# Patient Record
Sex: FEMALE | Race: WHITE | NOT HISPANIC OR LATINO | Employment: OTHER | URBAN - METROPOLITAN AREA
[De-identification: names, ages, dates, MRNs, and addresses within clinical notes are randomized per-mention and may not be internally consistent; named-entity substitution may affect disease eponyms.]

---

## 2023-08-29 PROBLEM — F33.3 SEVERE RECURRENT MAJOR DEPRESSION WITH PSYCHOTIC FEATURES (MULTI): Status: ACTIVE | Noted: 2023-08-29

## 2023-08-29 PROBLEM — F33.1 MAJOR DEPRESSIVE DISORDER, RECURRENT EPISODE, MODERATE DEGREE (MULTI): Status: ACTIVE | Noted: 2023-08-29

## 2023-08-29 PROBLEM — F70 MILD INTELLECTUAL DISABILITY: Status: ACTIVE | Noted: 2023-08-29

## 2023-08-29 PROBLEM — T43.505A NEUROLEPTIC-INDUCED PARKINSONISM (MULTI): Status: ACTIVE | Noted: 2023-08-29

## 2023-08-29 PROBLEM — F63.9 IMPULSE CONTROL DISORDER: Status: ACTIVE | Noted: 2023-08-29

## 2023-08-29 PROBLEM — G21.11 NEUROLEPTIC-INDUCED PARKINSONISM (MULTI): Status: ACTIVE | Noted: 2023-08-29

## 2023-08-29 PROBLEM — R41.9 NEUROCOGNITIVE DISORDER: Status: ACTIVE | Noted: 2023-08-29

## 2023-08-29 RX ORDER — ARIPIPRAZOLE 10 MG/1
1 TABLET ORAL NIGHTLY
COMMUNITY
Start: 2020-01-25 | End: 2023-10-05 | Stop reason: SINTOL

## 2023-08-29 RX ORDER — ICOSAPENT ETHYL 1 G/1
CAPSULE ORAL
COMMUNITY
Start: 2020-01-25

## 2023-08-29 RX ORDER — MONTELUKAST SODIUM 4 MG/1
TABLET, CHEWABLE ORAL
COMMUNITY
Start: 2020-01-25

## 2023-08-29 RX ORDER — METFORMIN HYDROCHLORIDE 1000 MG/1
TABLET ORAL
COMMUNITY
Start: 2020-01-25

## 2023-08-29 RX ORDER — FUROSEMIDE 20 MG/1
TABLET ORAL
COMMUNITY
Start: 2020-01-25

## 2023-10-05 ENCOUNTER — OFFICE VISIT (OUTPATIENT)
Dept: NEUROLOGY | Facility: CLINIC | Age: 63
End: 2023-10-05
Payer: COMMERCIAL

## 2023-10-05 VITALS
HEART RATE: 72 BPM | SYSTOLIC BLOOD PRESSURE: 132 MMHG | WEIGHT: 197 LBS | HEIGHT: 60 IN | BODY MASS INDEX: 38.68 KG/M2 | DIASTOLIC BLOOD PRESSURE: 84 MMHG

## 2023-10-05 DIAGNOSIS — T43.505A NEUROLEPTIC-INDUCED PARKINSONISM (MULTI): ICD-10-CM

## 2023-10-05 DIAGNOSIS — F70 MILD INTELLECTUAL DISABILITY: Primary | ICD-10-CM

## 2023-10-05 DIAGNOSIS — G21.11 NEUROLEPTIC-INDUCED PARKINSONISM (MULTI): ICD-10-CM

## 2023-10-05 PROCEDURE — 1036F TOBACCO NON-USER: CPT | Performed by: PSYCHIATRY & NEUROLOGY

## 2023-10-05 PROCEDURE — 1159F MED LIST DOCD IN RCRD: CPT | Performed by: PSYCHIATRY & NEUROLOGY

## 2023-10-05 PROCEDURE — 99205 OFFICE O/P NEW HI 60 MIN: CPT | Performed by: PSYCHIATRY & NEUROLOGY

## 2023-10-05 RX ORDER — ATORVASTATIN CALCIUM 40 MG/1
40 TABLET, FILM COATED ORAL DAILY
COMMUNITY

## 2023-10-05 RX ORDER — ESCITALOPRAM OXALATE 5 MG/1
5 TABLET ORAL DAILY
COMMUNITY

## 2023-10-05 RX ORDER — ALLOPURINOL 100 MG/1
100 TABLET ORAL DAILY
COMMUNITY

## 2023-10-05 RX ORDER — TIRZEPATIDE 2.5 MG/.5ML
INJECTION, SOLUTION SUBCUTANEOUS
COMMUNITY
Start: 2023-09-08 | End: 2024-03-10 | Stop reason: SDUPTHER

## 2023-10-05 RX ORDER — LISINOPRIL 2.5 MG/1
10 TABLET ORAL DAILY
COMMUNITY

## 2023-10-05 RX ORDER — POTASSIUM CHLORIDE 750 MG/1
10 TABLET, FILM COATED, EXTENDED RELEASE ORAL
COMMUNITY
Start: 2021-07-15

## 2023-10-05 RX ORDER — PIOGLITAZONEHYDROCHLORIDE 15 MG/1
15 TABLET ORAL DAILY
COMMUNITY

## 2023-10-05 RX ORDER — POLYETHYLENE GLYCOL 3350 17 G/17G
17 POWDER, FOR SOLUTION ORAL
COMMUNITY
Start: 2023-03-03

## 2023-10-05 RX ORDER — ACETAMINOPHEN 500 MG
1 TABLET ORAL
COMMUNITY
Start: 2021-09-22

## 2023-10-05 NOTE — PATIENT INSTRUCTIONS
Your client, Trini Perez is a delightful person, and appears to be doing well.  She has very mild   residual features of parkinsonism related to a history of being on a neuroleptic(Abilify).  I think these symptoms will gradually improve off the Abilify- and avoid any further exposure to a neuroleptic type medication.  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  Thank you for coming in today, follow up is left open ended.

## 2023-10-05 NOTE — PROGRESS NOTES
Subjective   Trini Perez is a 63 y.o.   female.  This is a 63 year old woman, with chronic intellectual disability, lives in her own apartment with some visiting assistance.  She was referred for a history of involuntary movements in the right hand- has resolved, with tapering off of Abilify (for hallucinations- resolved).  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   Her caregiver today, Lashon, states the patient is at baseline cognitively- she is high functioning.  She has recurrent depression with psychosis.  Her psychiatry professional is Lidya Arciniega.        Objective   Neurological Exam  Mental Status   Oriented only to person. Oriented to season, not month, year.. Mild dysarthria present. Language is fluent with no aphasia.    Cranial Nerves  CN II: Visual fields full to confrontation.  CN III, IV, VI: Extraocular movements intact bilaterally.   Right pupil: 3 mm.   Left pupil: 3 mm.  CN VII: Full and symmetric facial movement.  CN VIII: Hearing is normal.  CN IX, X: Palate elevates symmetrically  CN XI: Shoulder shrug strength is normal.  CN XII: Tongue midline without atrophy or fasciculations.    Motor  Normal muscle bulk throughout. No fasciculations present. Increased muscle tone. No abnormal involuntary movements. Diffuse bradykinesia..   Strength is 5/5 throughout all four extremities.    Sensory  Sensation is intact to light touch, pinprick, vibration and proprioception in all four extremities.    Reflexes                                            Right                      Left  Brachioradialis                    1+                         1+  Biceps                                 1+                         1+  Triceps                                1+                         1+  Finger flex                                                   1+  Hamstring                                                    1+  Patellar                                1+                         1+  Achilles                                 1+                         1+  Right Plantar: downgoing  Left Plantar: downgoing    Coordination  Right: Finger-to-nose normal.Left: Finger-to-nose normal.    Gait  Casual gait: Reduced right arm swing. Reduced left arm swing. Romberg is absent. Normal pull test. Able to rise from chair without using arms.    Physical Exam  Eyes:      Extraocular Movements: Extraocular movements intact.   Neurological:      Cranial Nerves: Dysarthria present.      Motor: Motor strength is normal.     Coordination: Romberg sign negative.      Deep Tendon Reflexes:      Reflex Scores:       Tricep reflexes are 1+ on the right side and 1+ on the left side.       Bicep reflexes are 1+ on the right side and 1+ on the left side.       Brachioradialis reflexes are 1+ on the right side and 1+ on the left side.       Patellar reflexes are 1+ on the right side and 1+ on the left side.       Achilles reflexes are 1+ on the right side and 1+ on the left side.      I personally reviewed laboratory, radiographic, and medical studies which were pertinent for Trini.    Assessment/Plan

## 2023-12-20 ENCOUNTER — APPOINTMENT (OUTPATIENT)
Dept: RADIOLOGY | Facility: HOSPITAL | Age: 63
End: 2023-12-20
Payer: COMMERCIAL

## 2023-12-20 ENCOUNTER — HOSPITAL ENCOUNTER (EMERGENCY)
Facility: HOSPITAL | Age: 63
Discharge: HOME | End: 2023-12-20
Attending: EMERGENCY MEDICINE
Payer: COMMERCIAL

## 2023-12-20 VITALS
SYSTOLIC BLOOD PRESSURE: 150 MMHG | RESPIRATION RATE: 20 BRPM | OXYGEN SATURATION: 98 % | HEART RATE: 82 BPM | WEIGHT: 197.09 LBS | TEMPERATURE: 98.1 F | HEIGHT: 60 IN | DIASTOLIC BLOOD PRESSURE: 77 MMHG | BODY MASS INDEX: 38.69 KG/M2

## 2023-12-20 DIAGNOSIS — R06.02 SHORTNESS OF BREATH: Primary | ICD-10-CM

## 2023-12-20 LAB
ALBUMIN SERPL-MCNC: 3.9 G/DL (ref 3.5–5)
ALBUMIN SERPL-MCNC: 4 G/DL (ref 3.5–5)
ALP BLD-CCNC: 80 U/L (ref 35–125)
ALP BLD-CCNC: 81 U/L (ref 35–125)
ALT SERPL-CCNC: 30 U/L (ref 5–40)
ALT SERPL-CCNC: 30 U/L (ref 5–40)
ANION GAP SERPL CALC-SCNC: 13 MMOL/L
ANION GAP SERPL CALC-SCNC: 18 MMOL/L
AST SERPL-CCNC: 31 U/L (ref 5–40)
AST SERPL-CCNC: 36 U/L (ref 5–40)
BASOPHILS # BLD AUTO: 0.05 X10*3/UL (ref 0–0.1)
BASOPHILS NFR BLD AUTO: 0.4 %
BILIRUB SERPL-MCNC: 0.7 MG/DL (ref 0.1–1.2)
BILIRUB SERPL-MCNC: 0.7 MG/DL (ref 0.1–1.2)
BUN SERPL-MCNC: 8 MG/DL (ref 8–25)
BUN SERPL-MCNC: 8 MG/DL (ref 8–25)
CALCIUM SERPL-MCNC: 9.1 MG/DL (ref 8.5–10.4)
CALCIUM SERPL-MCNC: 9.3 MG/DL (ref 8.5–10.4)
CHLORIDE SERPL-SCNC: 103 MMOL/L (ref 97–107)
CHLORIDE SERPL-SCNC: 104 MMOL/L (ref 97–107)
CO2 SERPL-SCNC: 16 MMOL/L (ref 24–31)
CO2 SERPL-SCNC: 20 MMOL/L (ref 24–31)
CREAT SERPL-MCNC: 0.4 MG/DL (ref 0.4–1.6)
CREAT SERPL-MCNC: 0.4 MG/DL (ref 0.4–1.6)
D DIMER PPP FEU-MCNC: 0.2 MG/L FEU (ref 0.19–0.5)
EOSINOPHIL # BLD AUTO: 0.06 X10*3/UL (ref 0–0.7)
EOSINOPHIL NFR BLD AUTO: 0.5 %
ERYTHROCYTE [DISTWIDTH] IN BLOOD BY AUTOMATED COUNT: 13.5 % (ref 11.5–14.5)
FLUAV RNA RESP QL NAA+PROBE: NOT DETECTED
FLUBV RNA RESP QL NAA+PROBE: NOT DETECTED
GFR SERPL CREATININE-BSD FRML MDRD: >90 ML/MIN/1.73M*2
GFR SERPL CREATININE-BSD FRML MDRD: >90 ML/MIN/1.73M*2
GLUCOSE SERPL-MCNC: 124 MG/DL (ref 65–99)
GLUCOSE SERPL-MCNC: 134 MG/DL (ref 65–99)
HCT VFR BLD AUTO: 39.9 % (ref 36–46)
HGB BLD-MCNC: 13.8 G/DL (ref 12–16)
HOLD SPECIMEN: NORMAL
IMM GRANULOCYTES # BLD AUTO: 0.05 X10*3/UL (ref 0–0.7)
IMM GRANULOCYTES NFR BLD AUTO: 0.4 % (ref 0–0.9)
LYMPHOCYTES # BLD AUTO: 2.35 X10*3/UL (ref 1.2–4.8)
LYMPHOCYTES NFR BLD AUTO: 20.3 %
MCH RBC QN AUTO: 29.7 PG (ref 26–34)
MCHC RBC AUTO-ENTMCNC: 34.6 G/DL (ref 32–36)
MCV RBC AUTO: 86 FL (ref 80–100)
MONOCYTES # BLD AUTO: 0.89 X10*3/UL (ref 0.1–1)
MONOCYTES NFR BLD AUTO: 7.7 %
NEUTROPHILS # BLD AUTO: 8.17 X10*3/UL (ref 1.2–7.7)
NEUTROPHILS NFR BLD AUTO: 70.7 %
NRBC BLD-RTO: 0 /100 WBCS (ref 0–0)
NT-PROBNP SERPL-MCNC: <36 PG/ML (ref 0–353)
PLATELET # BLD AUTO: 311 X10*3/UL (ref 150–450)
POTASSIUM SERPL-SCNC: 3.6 MMOL/L (ref 3.4–5.1)
POTASSIUM SERPL-SCNC: 3.8 MMOL/L (ref 3.4–5.1)
PROT SERPL-MCNC: 7.4 G/DL (ref 5.9–7.9)
PROT SERPL-MCNC: 7.7 G/DL (ref 5.9–7.9)
RBC # BLD AUTO: 4.64 X10*6/UL (ref 4–5.2)
SARS-COV-2 RNA RESP QL NAA+PROBE: NOT DETECTED
SODIUM SERPL-SCNC: 137 MMOL/L (ref 133–145)
SODIUM SERPL-SCNC: 137 MMOL/L (ref 133–145)
TROPONIN T SERPL-MCNC: 7 NG/L
TROPONIN T SERPL-MCNC: 8 NG/L
WBC # BLD AUTO: 11.6 X10*3/UL (ref 4.4–11.3)

## 2023-12-20 PROCEDURE — 93005 ELECTROCARDIOGRAM TRACING: CPT

## 2023-12-20 PROCEDURE — 71046 X-RAY EXAM CHEST 2 VIEWS: CPT

## 2023-12-20 PROCEDURE — 80053 COMPREHEN METABOLIC PANEL: CPT | Performed by: EMERGENCY MEDICINE

## 2023-12-20 PROCEDURE — 99283 EMERGENCY DEPT VISIT LOW MDM: CPT | Mod: 25

## 2023-12-20 PROCEDURE — 85025 COMPLETE CBC W/AUTO DIFF WBC: CPT | Performed by: EMERGENCY MEDICINE

## 2023-12-20 PROCEDURE — 85300 ANTITHROMBIN III ACTIVITY: CPT | Performed by: EMERGENCY MEDICINE

## 2023-12-20 PROCEDURE — 87636 SARSCOV2 & INF A&B AMP PRB: CPT | Performed by: EMERGENCY MEDICINE

## 2023-12-20 PROCEDURE — 99284 EMERGENCY DEPT VISIT MOD MDM: CPT | Performed by: EMERGENCY MEDICINE

## 2023-12-20 PROCEDURE — 36415 COLL VENOUS BLD VENIPUNCTURE: CPT | Performed by: EMERGENCY MEDICINE

## 2023-12-20 PROCEDURE — 84484 ASSAY OF TROPONIN QUANT: CPT | Performed by: EMERGENCY MEDICINE

## 2023-12-20 PROCEDURE — 83880 ASSAY OF NATRIURETIC PEPTIDE: CPT | Performed by: EMERGENCY MEDICINE

## 2023-12-20 RX ORDER — IPRATROPIUM BROMIDE AND ALBUTEROL SULFATE 2.5; .5 MG/3ML; MG/3ML
3 SOLUTION RESPIRATORY (INHALATION) ONCE
Status: DISCONTINUED | OUTPATIENT
Start: 2023-12-20 | End: 2023-12-20

## 2023-12-20 ASSESSMENT — COLUMBIA-SUICIDE SEVERITY RATING SCALE - C-SSRS
2. HAVE YOU ACTUALLY HAD ANY THOUGHTS OF KILLING YOURSELF?: NO
1. IN THE PAST MONTH, HAVE YOU WISHED YOU WERE DEAD OR WISHED YOU COULD GO TO SLEEP AND NOT WAKE UP?: NO
6. HAVE YOU EVER DONE ANYTHING, STARTED TO DO ANYTHING, OR PREPARED TO DO ANYTHING TO END YOUR LIFE?: NO

## 2023-12-20 ASSESSMENT — PAIN - FUNCTIONAL ASSESSMENT: PAIN_FUNCTIONAL_ASSESSMENT: 0-10

## 2023-12-20 NOTE — DISCHARGE INSTRUCTIONS
Please return to the ER for symptoms worsen otherwise please make sure to follow-up with her primary care doctor.

## 2023-12-20 NOTE — ED PROVIDER NOTES
HPI   Chief Complaint   Patient presents with    Shortness of Breath     Patient having some shortness of breathe       HPI     63-year-old female with history of developmental disability, diabetes, recent right ankle fracture presenting with abnormal behavior.  Per caretaker at bedside patient has had abnormal breathing since this morning.  Patient has episodes where she takes rapid deep breaths which is not her baseline.  Endorses having shortness of breath, denies any chest pain no nausea no vomiting abdominal pain or.  Pain anywhere else.  Otherwise difficult to get a history             No data recorded                Patient History   History reviewed. No pertinent past medical history.  History reviewed. No pertinent surgical history.  No family history on file.  Social History     Tobacco Use    Smoking status: Never     Passive exposure: Never    Smokeless tobacco: Never   Substance Use Topics    Alcohol use: Never    Drug use: Never       Physical Exam   ED Triage Vitals [12/20/23 1046]   Temp Heart Rate Resp BP   36.7 °C (98.1 °F) 81 18 166/68      SpO2 Temp Source Heart Rate Source Patient Position   95 % Oral -- Sitting      BP Location FiO2 (%)     Left arm --       Physical Exam  Vitals and nursing note reviewed.   Constitutional:       General: She is not in acute distress.     Appearance: She is well-developed.   HENT:      Head: Normocephalic and atraumatic.   Eyes:      Conjunctiva/sclera: Conjunctivae normal.   Cardiovascular:      Rate and Rhythm: Normal rate and regular rhythm.   Pulmonary:      Effort: Pulmonary effort is normal. No respiratory distress.      Breath sounds: Normal breath sounds. No decreased breath sounds, wheezing or rales.   Abdominal:      Palpations: Abdomen is soft.      Tenderness: There is no abdominal tenderness.   Musculoskeletal:         General: No swelling.      Cervical back: Neck supple.      Comments: Right lower extremity in cast.  Toes warm well-perfused good  cap refill   Skin:     General: Skin is warm and dry.      Capillary Refill: Capillary refill takes less than 2 seconds.   Neurological:      Mental Status: She is alert.   Psychiatric:         Mood and Affect: Mood normal.         ED Course & MDM   Diagnoses as of 12/20/23 1517   Shortness of breath       Medical Decision Making    Vital stable, physical exam as above.  Unclear etiology of patient's symptoms, she does not appear to be short of breath on my exam however every once in a while she does have these short rapid breaths which self resolve after about 5 seconds.  Given patient is in a boot with a recent action ankle fracture we will get a D-dimer.  Chest x-ray COVID and flu pending.  Cardiac workup also pending.  Patient to be reevaluated pending pending results.      Patient appears at baseline satting well on room air lab work and imaging largely unremarkable awaiting repeat troponin to be discharged by oncoming physician if within normal limits  Procedure  Procedures     Juanita Azul MD  12/20/23 7945

## 2023-12-20 NOTE — PROGRESS NOTES
Trini Perez is a 63 y.o. female on day 0 of admission presenting with No Principal Problem: There is no principal problem currently on the Problem List. Please update the Problem List and refresh..  Care of the patient signed out to me pending repeat troponin level.  In short she is a 63-year-old female from a group home with a history of mental display presenting to the ED for evaluation of shortness of breath.  She has been having short episodes of shallow breathing during which she feels short of breath.  Oxygen saturation does not decline during these episodes as administered in the ED here.  She has a negative D-dimer excluding PE, she is negative for COVID flu and there is no evidence of heart failure or pneumonia.  At the time of signout she was pending repeat troponin to exclude acute coronary syndrome.  X-ray does show mild cardiomegaly.    I did review her EKG showing T wave inversions in V2 and V3, otherwise no ST elevation or depression, no acute ischemic pattern.  EKG is otherwise unremarkable.  She has not had any chest pain during these episodes and does not report any chest pain now. Secondary problems negative, no evidence of evolving ischemia/ongoing ACS causing her symptoms.  She was discharged in stable condition.    Assessment/Plan   Active Problems:  There are no active Hospital Problems.  Shortness of breath       I spent 20 minutes in the professional and overall care of this patient.      Jack Sanz DO

## 2023-12-21 LAB
ATRIAL RATE: 81 BPM
P AXIS: 51 DEGREES
P OFFSET: 193 MS
P ONSET: 133 MS
PR INTERVAL: 160 MS
Q ONSET: 213 MS
QRS COUNT: 14 BEATS
QRS DURATION: 88 MS
QT INTERVAL: 410 MS
QTC CALCULATION(BAZETT): 476 MS
QTC FREDERICIA: 453 MS
R AXIS: 17 DEGREES
T AXIS: 28 DEGREES
T OFFSET: 418 MS
VENTRICULAR RATE: 81 BPM

## 2024-01-08 ENCOUNTER — OFFICE VISIT (OUTPATIENT)
Dept: ENDOCRINOLOGY | Facility: CLINIC | Age: 64
End: 2024-01-08
Payer: COMMERCIAL

## 2024-01-08 VITALS
BODY MASS INDEX: 35.74 KG/M2 | HEART RATE: 69 BPM | WEIGHT: 183 LBS | DIASTOLIC BLOOD PRESSURE: 87 MMHG | SYSTOLIC BLOOD PRESSURE: 152 MMHG

## 2024-01-08 DIAGNOSIS — E11.9 TYPE 2 DIABETES MELLITUS WITHOUT COMPLICATION, WITHOUT LONG-TERM CURRENT USE OF INSULIN (MULTI): Primary | ICD-10-CM

## 2024-01-08 LAB — POC HEMOGLOBIN A1C: 6.5 % (ref 4.2–6.5)

## 2024-01-08 PROCEDURE — 4010F ACE/ARB THERAPY RXD/TAKEN: CPT | Performed by: INTERNAL MEDICINE

## 2024-01-08 PROCEDURE — 99204 OFFICE O/P NEW MOD 45 MIN: CPT | Performed by: INTERNAL MEDICINE

## 2024-01-08 PROCEDURE — 3077F SYST BP >= 140 MM HG: CPT | Performed by: INTERNAL MEDICINE

## 2024-01-08 PROCEDURE — 1159F MED LIST DOCD IN RCRD: CPT | Performed by: INTERNAL MEDICINE

## 2024-01-08 PROCEDURE — 1036F TOBACCO NON-USER: CPT | Performed by: INTERNAL MEDICINE

## 2024-01-08 PROCEDURE — 1160F RVW MEDS BY RX/DR IN RCRD: CPT | Performed by: INTERNAL MEDICINE

## 2024-01-08 PROCEDURE — 3079F DIAST BP 80-89 MM HG: CPT | Performed by: INTERNAL MEDICINE

## 2024-01-08 PROCEDURE — 83036 HEMOGLOBIN GLYCOSYLATED A1C: CPT | Performed by: INTERNAL MEDICINE

## 2024-01-08 RX ORDER — LANCETS 33 GAUGE
EACH MISCELLANEOUS
Qty: 100 EACH | Refills: 3 | Status: SHIPPED | OUTPATIENT
Start: 2024-01-08

## 2024-01-08 RX ORDER — DEXTROSE 4 G
TABLET,CHEWABLE ORAL
Qty: 1 EACH | Refills: 0 | Status: SHIPPED | OUTPATIENT
Start: 2024-01-08

## 2024-01-08 RX ORDER — BLOOD-GLUCOSE METER
EACH MISCELLANEOUS
Qty: 100 STRIP | Refills: 3 | Status: SHIPPED | OUTPATIENT
Start: 2024-01-08

## 2024-01-08 ASSESSMENT — ENCOUNTER SYMPTOMS
NAUSEA: 0
DIFFICULTY URINATING: 0
COUGH: 0
CONSTIPATION: 0
ENDOCRINE NEGATIVE: 1
FREQUENCY: 0
UNEXPECTED WEIGHT CHANGE: 0
SHORTNESS OF BREATH: 0
VOMITING: 0
DIARRHEA: 0

## 2024-01-08 NOTE — PATIENT INSTRUCTIONS
RECOMMENDATIONS  Continue current program    Follow up 6 months    Glucose meter for use as needed    Labs as ordered by Dr. Peters.

## 2024-01-08 NOTE — PROGRESS NOTES
History Of Present Illness  Trini Perez is a 63 y.o. female     Duration of type 2 diabetes mellitus:  at least 17 years  Complications:  none    Right distal fibular fracture 2 months ago on ice, casted, now in a plastic boot.    Weight loss 24 lbs on Mounjaro.     Mounjaro 2.5 mg/week on Saturdays, started 6 months ago.    Metformin 1000 mg twice daily  Pioglitazone 15 mg/day    Patient is not testing glucose     Patient lives independently  Presented with .     Last eye exam:  2023    Past Medical History  She has no past medical history on file.    Surgical History  She has no past surgical history on file.     Social History  She reports that she has never smoked. She has never been exposed to tobacco smoke. She has never used smokeless tobacco. She reports that she does not drink alcohol and does not use drugs.    Family History  No family history on file.    Medications  Current Outpatient Medications   Medication Instructions    allopurinol (ZYLOPRIM) 100 mg, oral, Daily    atorvastatin (LIPITOR) 40 mg, oral, Daily    cholecalciferol (Vitamin D-3) 50 mcg (2,000 unit) capsule 1 capsule, oral, Daily RT    colestipol (Colestid) 1 gram tablet oral    escitalopram (LEXAPRO) 5 mg, oral, Daily    furosemide (Lasix) 20 mg tablet oral    icosapent ethyL (Vascepa) 1 gram capsule oral    lisinopril 10 mg, oral, Daily    metFORMIN (Glucophage) 1,000 mg tablet oral    Mounjaro 2.5 mg/0.5 mL pen injector INJECT 2.5 MG SUBCUTANEOUSLY 1 TIME A WEEK    multivitamin capsule oral, Daily RT    pioglitazone (ACTOS) 15 mg, oral, Daily    polyethylene glycol (GLYCOLAX, MIRALAX) 17 g, oral, Daily RT    potassium chloride CR 10 mEq ER tablet 10 mEq, oral, Daily RT       Allergies  Patient has no known allergies.    Review of Systems   Constitutional:  Negative for unexpected weight change (intentional loss).   Eyes:  Negative for visual disturbance.   Respiratory:  Negative for cough and shortness of breath.     Cardiovascular:  Negative for chest pain.   Gastrointestinal:  Negative for constipation, diarrhea, nausea and vomiting.   Endocrine: Negative.    Genitourinary:  Negative for difficulty urinating and frequency.         Last Recorded Vitals  Blood pressure 152/87, pulse 69, weight 83 kg (183 lb).    Physical Exam  Constitutional:       General: She is not in acute distress.  HENT:      Head: Normocephalic.   Eyes:      Extraocular Movements: Extraocular movements intact.   Neck:      Thyroid: No thyromegaly.   Cardiovascular:      Pulses:           Radial pulses are 2+ on the right side.        Dorsalis pedis pulses are 2+ on the right side and 2+ on the left side.   Musculoskeletal:      Right lower leg: No edema.      Left lower leg: No edema.      Right foot: No deformity.      Left foot: No deformity.   Lymphadenopathy:      Cervical: No cervical adenopathy.   Neurological:      Mental Status: She is alert.      Motor: No tremor.          Relevant Results  Glucose   Date Value   12/20/2023 134 mg/dL (H)   12/20/2023 124 mg/dL (H)   07/20/2020 88 mg/dL   11/13/2018 93 MG/DL     Hemoglobin A1C (%)   Date Value   09/21/2023 6.7 (H)   03/14/2023 6.1 (H)   08/17/2022 6.1 (H)     Bicarbonate   Date Value   12/20/2023 20 mmol/L (L)   12/20/2023 16 mmol/L (L)   07/20/2020 25 mmol/L   11/13/2018 23 MMOL/L (L)     Urea Nitrogen   Date Value   12/20/2023 8 mg/dL   12/20/2023 8 mg/dL   07/20/2020 12 mg/dL   11/13/2018 11 MG/DL     Creatinine   Date Value   12/20/2023 0.40 mg/dL   12/20/2023 0.40 mg/dL   07/20/2020 0.43 mg/dL (L)   11/13/2018 0.5 MG/DL     Lab Results   Component Value Date    TSH 7.92 (H) 07/20/2020       IMPRESSION  TYPE 2 DIABETES MELLITUS  Well controlled glucose   No known complications      RECOMMENDATIONS  Continue current program    Follow up 6 months    Glucose meter for use as needed    Labs as ordered by Dr. Peters.

## 2024-01-08 NOTE — LETTER
January 9, 2024     Malik Peters DO  [address] [place] [id number]    Patient: Trini Perez   YOB: 1960   Date of Visit: 1/8/2024       Dear Dr. Malik Peters DO:    Thank you for referring Trini Perez to me for evaluation. Below are my notes for this consultation.  If you have questions, please do not hesitate to call me. I look forward to following your patient along with you.       Sincerely,     Star Bhandari MD      CC: No Recipients  ______________________________________________________________________________________    History Of Present Illness  Trini Perez is a 63 y.o. female     Duration of type 2 diabetes mellitus:  at least 17 years  Complications:  none    Right distal fibular fracture 2 months ago on ice, casted, now in a plastic boot.    Weight loss 24 lbs on Mounjaro.     Mounjaro 2.5 mg/week on Saturdays, started 6 months ago.    Metformin 1000 mg twice daily  Pioglitazone 15 mg/day    Patient is not testing glucose     Patient lives independently  Presented with .     Last eye exam:  2023    Past Medical History  She has no past medical history on file.    Surgical History  She has no past surgical history on file.     Social History  She reports that she has never smoked. She has never been exposed to tobacco smoke. She has never used smokeless tobacco. She reports that she does not drink alcohol and does not use drugs.    Family History  No family history on file.    Medications  Current Outpatient Medications   Medication Instructions   • allopurinol (ZYLOPRIM) 100 mg, oral, Daily   • atorvastatin (LIPITOR) 40 mg, oral, Daily   • cholecalciferol (Vitamin D-3) 50 mcg (2,000 unit) capsule 1 capsule, oral, Daily RT   • colestipol (Colestid) 1 gram tablet oral   • escitalopram (LEXAPRO) 5 mg, oral, Daily   • furosemide (Lasix) 20 mg tablet oral   • icosapent ethyL (Vascepa) 1 gram capsule oral   • lisinopril 10 mg, oral, Daily   •  metFORMIN (Glucophage) 1,000 mg tablet oral   • Mounjaro 2.5 mg/0.5 mL pen injector INJECT 2.5 MG SUBCUTANEOUSLY 1 TIME A WEEK   • multivitamin capsule oral, Daily RT   • pioglitazone (ACTOS) 15 mg, oral, Daily   • polyethylene glycol (GLYCOLAX, MIRALAX) 17 g, oral, Daily RT   • potassium chloride CR 10 mEq ER tablet 10 mEq, oral, Daily RT       Allergies  Patient has no known allergies.    Review of Systems   Constitutional:  Negative for unexpected weight change (intentional loss).   Eyes:  Negative for visual disturbance.   Respiratory:  Negative for cough and shortness of breath.    Cardiovascular:  Negative for chest pain.   Gastrointestinal:  Negative for constipation, diarrhea, nausea and vomiting.   Endocrine: Negative.    Genitourinary:  Negative for difficulty urinating and frequency.         Last Recorded Vitals  Blood pressure 152/87, pulse 69, weight 83 kg (183 lb).    Physical Exam  Constitutional:       General: She is not in acute distress.  HENT:      Head: Normocephalic.   Eyes:      Extraocular Movements: Extraocular movements intact.   Neck:      Thyroid: No thyromegaly.   Cardiovascular:      Pulses:           Radial pulses are 2+ on the right side.        Dorsalis pedis pulses are 2+ on the right side and 2+ on the left side.   Musculoskeletal:      Right lower leg: No edema.      Left lower leg: No edema.      Right foot: No deformity.      Left foot: No deformity.   Lymphadenopathy:      Cervical: No cervical adenopathy.   Neurological:      Mental Status: She is alert.      Motor: No tremor.          Relevant Results  Glucose   Date Value   12/20/2023 134 mg/dL (H)   12/20/2023 124 mg/dL (H)   07/20/2020 88 mg/dL   11/13/2018 93 MG/DL     Hemoglobin A1C (%)   Date Value   09/21/2023 6.7 (H)   03/14/2023 6.1 (H)   08/17/2022 6.1 (H)     Bicarbonate   Date Value   12/20/2023 20 mmol/L (L)   12/20/2023 16 mmol/L (L)   07/20/2020 25 mmol/L   11/13/2018 23 MMOL/L (L)     Urea Nitrogen   Date  Value   12/20/2023 8 mg/dL   12/20/2023 8 mg/dL   07/20/2020 12 mg/dL   11/13/2018 11 MG/DL     Creatinine   Date Value   12/20/2023 0.40 mg/dL   12/20/2023 0.40 mg/dL   07/20/2020 0.43 mg/dL (L)   11/13/2018 0.5 MG/DL     Lab Results   Component Value Date    TSH 7.92 (H) 07/20/2020       IMPRESSION  TYPE 2 DIABETES MELLITUS  Well controlled glucose   No known complications      RECOMMENDATIONS  Continue current program    Follow up 6 months    Glucose meter for use as needed    Labs as ordered by Dr. Peters.

## 2024-03-10 DIAGNOSIS — E11.9 TYPE 2 DIABETES MELLITUS WITHOUT COMPLICATION, WITHOUT LONG-TERM CURRENT USE OF INSULIN (MULTI): Primary | ICD-10-CM

## 2024-03-10 RX ORDER — TIRZEPATIDE 2.5 MG/.5ML
2.5 INJECTION, SOLUTION SUBCUTANEOUS
Qty: 6 ML | Refills: 3 | Status: SHIPPED | OUTPATIENT
Start: 2024-03-10 | End: 2025-03-10

## 2024-06-18 ENCOUNTER — TELEPHONE (OUTPATIENT)
Dept: ENDOCRINOLOGY | Facility: CLINIC | Age: 64
End: 2024-06-18
Payer: COMMERCIAL

## 2024-06-18 NOTE — TELEPHONE ENCOUNTER
Lashon (pt's caregiver) stopped by office and is asking for our office to please refill the Mounjaro 2.5, Actos 15, and the Metformin 1000 at the [facility] in [place] (verified)

## 2024-06-19 DIAGNOSIS — E11.9 TYPE 2 DIABETES MELLITUS WITHOUT COMPLICATION, WITHOUT LONG-TERM CURRENT USE OF INSULIN (MULTI): ICD-10-CM

## 2024-06-19 RX ORDER — PIOGLITAZONEHYDROCHLORIDE 15 MG/1
15 TABLET ORAL DAILY
Qty: 90 TABLET | Refills: 3 | Status: SHIPPED | OUTPATIENT
Start: 2024-06-19 | End: 2025-06-19

## 2024-06-19 RX ORDER — METFORMIN HYDROCHLORIDE 1000 MG/1
1000 TABLET ORAL
Qty: 180 TABLET | Refills: 3 | Status: SHIPPED | OUTPATIENT
Start: 2024-06-19 | End: 2025-06-19

## 2024-06-19 RX ORDER — TIRZEPATIDE 2.5 MG/.5ML
2.5 INJECTION, SOLUTION SUBCUTANEOUS
Qty: 6 ML | Refills: 3 | Status: SHIPPED | OUTPATIENT
Start: 2024-06-19 | End: 2025-06-19

## 2024-07-19 ENCOUNTER — APPOINTMENT (OUTPATIENT)
Dept: ENDOCRINOLOGY | Facility: CLINIC | Age: 64
End: 2024-07-19
Payer: COMMERCIAL

## 2024-07-19 VITALS
SYSTOLIC BLOOD PRESSURE: 139 MMHG | DIASTOLIC BLOOD PRESSURE: 81 MMHG | BODY MASS INDEX: 35.74 KG/M2 | HEART RATE: 66 BPM | WEIGHT: 183 LBS

## 2024-07-19 DIAGNOSIS — E11.9 TYPE 2 DIABETES MELLITUS WITHOUT COMPLICATION, WITHOUT LONG-TERM CURRENT USE OF INSULIN (MULTI): ICD-10-CM

## 2024-07-19 LAB — POC HEMOGLOBIN A1C: 6 % (ref 4.2–6.5)

## 2024-07-19 PROCEDURE — 3079F DIAST BP 80-89 MM HG: CPT | Performed by: INTERNAL MEDICINE

## 2024-07-19 PROCEDURE — 4010F ACE/ARB THERAPY RXD/TAKEN: CPT | Performed by: INTERNAL MEDICINE

## 2024-07-19 PROCEDURE — 83036 HEMOGLOBIN GLYCOSYLATED A1C: CPT | Performed by: INTERNAL MEDICINE

## 2024-07-19 PROCEDURE — 99214 OFFICE O/P EST MOD 30 MIN: CPT | Performed by: INTERNAL MEDICINE

## 2024-07-19 PROCEDURE — 3075F SYST BP GE 130 - 139MM HG: CPT | Performed by: INTERNAL MEDICINE

## 2024-07-19 PROCEDURE — 1159F MED LIST DOCD IN RCRD: CPT | Performed by: INTERNAL MEDICINE

## 2024-07-19 PROCEDURE — 1160F RVW MEDS BY RX/DR IN RCRD: CPT | Performed by: INTERNAL MEDICINE

## 2024-07-19 RX ORDER — LEVOTHYROXINE SODIUM 25 UG/1
1 TABLET ORAL
COMMUNITY
Start: 2024-05-16

## 2024-07-19 RX ORDER — TIRZEPATIDE 5 MG/.5ML
5 INJECTION, SOLUTION SUBCUTANEOUS
Qty: 2 ML | Refills: 11 | Status: SHIPPED | OUTPATIENT
Start: 2024-07-19 | End: 2025-07-19

## 2024-07-19 NOTE — LETTER
July 20, 2024     Malik GREEN DO  [address] [place] [id number]    Patient: Trini Perez   YOB: 1960   Date of Visit: 7/19/2024       Dear Dr. Malik GREEN DO:    Thank you for referring Trini Perez to me for evaluation. Below are my notes for this consultation.  If you have questions, please do not hesitate to call me. I look forward to following your patient along with you.       Sincerely,     Star Bhandari MD      CC: No Recipients  ______________________________________________________________________________________    History Of Present Illness  Trini Perez is a 63 y.o. female     Duration of type 2 diabetes mellitus:  at least 17 years  Complications:  none     Right distal fibular fracture last winter     Weight loss on Mounjaro, leveled off since last appointment     Mounjaro 2.5 mg/week on Saturdays, started 6 months ago.    Metformin 1000 mg twice daily  Pioglitazone 15 mg/day     Testing glucose once per week  No records available     Patient lives independently  Presented with .      Last eye exam:  2023      Past Medical History  She has no past medical history on file.    Surgical History  She has no past surgical history on file.     Social History  She reports that she has never smoked. She has never been exposed to tobacco smoke. She has never used smokeless tobacco. She reports that she does not drink alcohol and does not use drugs.    Family History  No family history on file.    Medications  Current Outpatient Medications   Medication Instructions   • allopurinol (ZYLOPRIM) 100 mg, oral, Daily   • atorvastatin (LIPITOR) 40 mg, oral, Daily   • blood sugar diagnostic (OneTouch Verio test strips) strip For glucose testing once daily   • blood-glucose meter (OneTouch Verio Flex meter) misc For glucose testing once daily   • cholecalciferol (Vitamin D-3) 50 mcg (2,000 unit) capsule 1 capsule, oral, Daily RT   • colestipol (Colestid) 1 gram tablet  oral   • escitalopram (LEXAPRO) 5 mg, oral, Daily   • furosemide (Lasix) 20 mg tablet oral   • icosapent ethyL (Vascepa) 1 gram capsule oral   • lancets (OneTouch Delica Plus Lancet) 33 gauge misc For glucose testing once daily   • levothyroxine (Synthroid, Levoxyl) 25 mcg tablet 1 tablet, oral, Daily (0630)   • lisinopril 10 mg, oral, Daily   • metFORMIN (GLUCOPHAGE) 1,000 mg, oral, 2 times daily (morning and late afternoon)   • Mounjaro 2.5 mg, subcutaneous, Every 7 days   • multivitamin capsule oral, Daily RT   • pioglitazone (ACTOS) 15 mg, oral, Daily   • polyethylene glycol (GLYCOLAX, MIRALAX) 17 g, oral, Daily RT   • potassium chloride CR 10 mEq ER tablet 10 mEq, oral, Daily RT       Allergies  Patient has no known allergies.    Review of Systems   Constitutional:  Negative for appetite change and fever.   HENT:  Negative for sore throat.         Denies dry mouth   Eyes:  Negative for visual disturbance.   Respiratory:  Negative for cough and shortness of breath.    Cardiovascular:  Negative for chest pain.   Gastrointestinal:  Negative for abdominal pain, constipation, diarrhea, nausea and vomiting.   Endocrine: Negative for polydipsia and polyuria.   Genitourinary:  Negative for frequency.   Musculoskeletal:  Negative for arthralgias.   Skin:  Negative for rash.   Neurological:  Negative for headaches.   Psychiatric/Behavioral:  The patient is not nervous/anxious.          Last Recorded Vitals  Blood pressure 139/81, pulse 66, weight 83 kg (183 lb).    Physical Exam  Constitutional:       General: She is not in acute distress.  HENT:      Head: Normocephalic.      Mouth/Throat:      Mouth: Mucous membranes are moist.   Eyes:      Extraocular Movements: Extraocular movements intact.   Neck:      Thyroid: No thyroid mass or thyromegaly.   Cardiovascular:      Pulses:           Radial pulses are 2+ on the right side and 2+ on the left side.        Dorsalis pedis pulses are 2+ on the right side and 2+ on the  left side.   Musculoskeletal:      Right lower leg: No edema.      Left lower leg: No edema.   Feet:      Comments: Flat feet  Lymphadenopathy:      Cervical: No cervical adenopathy.   Neurological:      Mental Status: She is alert.      Motor: No tremor.          Relevant Results  Glucose   Date Value   12/20/2023 134 mg/dL (H)   12/20/2023 124 mg/dL (H)   07/20/2020 88 mg/dL   11/13/2018 93 MG/DL     POC HEMOGLOBIN A1c (%)   Date Value   07/19/2024 6.0   01/08/2024 6.5     Hemoglobin A1C (%)   Date Value   01/23/2024 5.8 (H)   09/21/2023 6.7 (H)   03/14/2023 6.1 (H)     Bicarbonate   Date Value   12/20/2023 20 mmol/L (L)   12/20/2023 16 mmol/L (L)   07/20/2020 25 mmol/L   11/13/2018 23 MMOL/L (L)     Urea Nitrogen   Date Value   12/20/2023 8 mg/dL   12/20/2023 8 mg/dL   07/20/2020 12 mg/dL   11/13/2018 11 MG/DL     Creatinine   Date Value   12/20/2023 0.40 mg/dL   12/20/2023 0.40 mg/dL   07/20/2020 0.43 mg/dL (L)   11/13/2018 0.5 MG/DL         IMPRESSION  TYPE 2 DIABETES MELLITUS  Rapid  A1c 6.0%  Excellent glucose control   Weight loss stopped    RECOMMENDATIONS  Increase Mounjaro to 5 mg every 7 days  Stop Pioglitazone    Follow up 6 months.

## 2024-07-19 NOTE — PROGRESS NOTES
History Of Present Illness  Trini Perez is a 63 y.o. female     Duration of type 2 diabetes mellitus:  at least 17 years  Complications:  none     Right distal fibular fracture last winter     Weight loss on Mounjaro, leveled off since last appointment     Mounjaro 2.5 mg/week on Saturdays, started 6 months ago.    Metformin 1000 mg twice daily  Pioglitazone 15 mg/day     Testing glucose once per week  No records available     Patient lives independently  Presented with .      Last eye exam:  2023      Past Medical History  She has no past medical history on file.    Surgical History  She has no past surgical history on file.     Social History  She reports that she has never smoked. She has never been exposed to tobacco smoke. She has never used smokeless tobacco. She reports that she does not drink alcohol and does not use drugs.    Family History  No family history on file.    Medications  Current Outpatient Medications   Medication Instructions    allopurinol (ZYLOPRIM) 100 mg, oral, Daily    atorvastatin (LIPITOR) 40 mg, oral, Daily    blood sugar diagnostic (OneTouch Verio test strips) strip For glucose testing once daily    blood-glucose meter (OneTouch Verio Flex meter) misc For glucose testing once daily    cholecalciferol (Vitamin D-3) 50 mcg (2,000 unit) capsule 1 capsule, oral, Daily RT    colestipol (Colestid) 1 gram tablet oral    escitalopram (LEXAPRO) 5 mg, oral, Daily    furosemide (Lasix) 20 mg tablet oral    icosapent ethyL (Vascepa) 1 gram capsule oral    lancets (OneTouch Delica Plus Lancet) 33 gauge misc For glucose testing once daily    levothyroxine (Synthroid, Levoxyl) 25 mcg tablet 1 tablet, oral, Daily (0630)    lisinopril 10 mg, oral, Daily    metFORMIN (GLUCOPHAGE) 1,000 mg, oral, 2 times daily (morning and late afternoon)    Mounjaro 2.5 mg, subcutaneous, Every 7 days    multivitamin capsule oral, Daily RT    pioglitazone (ACTOS) 15 mg, oral, Daily    polyethylene glycol  (GLYCOLAX, MIRALAX) 17 g, oral, Daily RT    potassium chloride CR 10 mEq ER tablet 10 mEq, oral, Daily RT       Allergies  Patient has no known allergies.    Review of Systems   Constitutional:  Negative for appetite change and fever.   HENT:  Negative for sore throat.         Denies dry mouth   Eyes:  Negative for visual disturbance.   Respiratory:  Negative for cough and shortness of breath.    Cardiovascular:  Negative for chest pain.   Gastrointestinal:  Negative for abdominal pain, constipation, diarrhea, nausea and vomiting.   Endocrine: Negative for polydipsia and polyuria.   Genitourinary:  Negative for frequency.   Musculoskeletal:  Negative for arthralgias.   Skin:  Negative for rash.   Neurological:  Negative for headaches.   Psychiatric/Behavioral:  The patient is not nervous/anxious.          Last Recorded Vitals  Blood pressure 139/81, pulse 66, weight 83 kg (183 lb).    Physical Exam  Constitutional:       General: She is not in acute distress.  HENT:      Head: Normocephalic.      Mouth/Throat:      Mouth: Mucous membranes are moist.   Eyes:      Extraocular Movements: Extraocular movements intact.   Neck:      Thyroid: No thyroid mass or thyromegaly.   Cardiovascular:      Pulses:           Radial pulses are 2+ on the right side and 2+ on the left side.        Dorsalis pedis pulses are 2+ on the right side and 2+ on the left side.   Musculoskeletal:      Right lower leg: No edema.      Left lower leg: No edema.   Feet:      Comments: Flat feet  Lymphadenopathy:      Cervical: No cervical adenopathy.   Neurological:      Mental Status: She is alert.      Motor: No tremor.          Relevant Results  Glucose   Date Value   12/20/2023 134 mg/dL (H)   12/20/2023 124 mg/dL (H)   07/20/2020 88 mg/dL   11/13/2018 93 MG/DL     POC HEMOGLOBIN A1c (%)   Date Value   07/19/2024 6.0   01/08/2024 6.5     Hemoglobin A1C (%)   Date Value   01/23/2024 5.8 (H)   09/21/2023 6.7 (H)   03/14/2023 6.1 (H)     Bicarbonate    Date Value   12/20/2023 20 mmol/L (L)   12/20/2023 16 mmol/L (L)   07/20/2020 25 mmol/L   11/13/2018 23 MMOL/L (L)     Urea Nitrogen   Date Value   12/20/2023 8 mg/dL   12/20/2023 8 mg/dL   07/20/2020 12 mg/dL   11/13/2018 11 MG/DL     Creatinine   Date Value   12/20/2023 0.40 mg/dL   12/20/2023 0.40 mg/dL   07/20/2020 0.43 mg/dL (L)   11/13/2018 0.5 MG/DL         IMPRESSION  TYPE 2 DIABETES MELLITUS  Rapid  A1c 6.0%  Excellent glucose control   Weight loss stopped    RECOMMENDATIONS  Increase Mounjaro to 5 mg every 7 days  Stop Pioglitazone    Follow up 6 months.

## 2024-07-20 ASSESSMENT — ENCOUNTER SYMPTOMS
CONSTIPATION: 0
HEADACHES: 0
DIARRHEA: 0
ABDOMINAL PAIN: 0
FREQUENCY: 0
VOMITING: 0
COUGH: 0
ARTHRALGIAS: 0
POLYDIPSIA: 0
FEVER: 0
SHORTNESS OF BREATH: 0
NAUSEA: 0
APPETITE CHANGE: 0
NERVOUS/ANXIOUS: 0
SORE THROAT: 0

## 2025-01-24 ENCOUNTER — APPOINTMENT (OUTPATIENT)
Dept: ENDOCRINOLOGY | Facility: CLINIC | Age: 65
End: 2025-01-24
Payer: COMMERCIAL

## 2025-01-24 VITALS
HEART RATE: 81 BPM | BODY MASS INDEX: 34.37 KG/M2 | DIASTOLIC BLOOD PRESSURE: 77 MMHG | WEIGHT: 176 LBS | SYSTOLIC BLOOD PRESSURE: 127 MMHG

## 2025-01-24 DIAGNOSIS — E11.9 TYPE 2 DIABETES MELLITUS WITHOUT COMPLICATION, WITHOUT LONG-TERM CURRENT USE OF INSULIN (MULTI): ICD-10-CM

## 2025-01-24 LAB — POC HEMOGLOBIN A1C: 5.8 % (ref 4.2–6.5)

## 2025-01-24 PROCEDURE — 1160F RVW MEDS BY RX/DR IN RCRD: CPT | Performed by: INTERNAL MEDICINE

## 2025-01-24 PROCEDURE — 99213 OFFICE O/P EST LOW 20 MIN: CPT | Performed by: INTERNAL MEDICINE

## 2025-01-24 PROCEDURE — 4010F ACE/ARB THERAPY RXD/TAKEN: CPT | Performed by: INTERNAL MEDICINE

## 2025-01-24 PROCEDURE — 3078F DIAST BP <80 MM HG: CPT | Performed by: INTERNAL MEDICINE

## 2025-01-24 PROCEDURE — G2211 COMPLEX E/M VISIT ADD ON: HCPCS | Performed by: INTERNAL MEDICINE

## 2025-01-24 PROCEDURE — 1159F MED LIST DOCD IN RCRD: CPT | Performed by: INTERNAL MEDICINE

## 2025-01-24 PROCEDURE — 3074F SYST BP LT 130 MM HG: CPT | Performed by: INTERNAL MEDICINE

## 2025-01-24 PROCEDURE — 83036 HEMOGLOBIN GLYCOSYLATED A1C: CPT | Performed by: INTERNAL MEDICINE

## 2025-01-24 RX ORDER — PIOGLITAZONEHYDROCHLORIDE 15 MG/1
15 TABLET ORAL
COMMUNITY
Start: 2024-08-19 | End: 2025-01-24 | Stop reason: ALTCHOICE

## 2025-01-24 RX ORDER — TIRZEPATIDE 7.5 MG/.5ML
7.5 INJECTION, SOLUTION SUBCUTANEOUS
Qty: 2 ML | Refills: 11 | Status: SHIPPED | OUTPATIENT
Start: 2025-01-24 | End: 2026-01-24

## 2025-01-24 NOTE — PROGRESS NOTES
History Of Present Illness  Trini Perez is a 64 y.o. female     Duration of type 2 diabetes mellitus:  at least 17 years  Complications:  none     Bump on right foot, not discussed at podiatry appointment 9 days ago.  Diabetic shoes  She had to wear her regular shoes on the opposite feet to compensate previously.    Weight loss with last dose changes     Mounjaro 5 mg/week on Saturdays  Metformin 1000 mg twice daily    Pioglitazone discontinued     Testing glucose once per week  No records available     Patient lives independently  Presented with .      Last eye exam:  2024    Podiatry:  Dr. Kowalski    Past Medical History  She has no past medical history on file.    Surgical History  She has no past surgical history on file.     Social History  She reports that she has never smoked. She has never been exposed to tobacco smoke. She has never used smokeless tobacco. She reports that she does not drink alcohol and does not use drugs.    Family History  No family history on file.    Medications  Current Outpatient Medications   Medication Instructions    allopurinol (ZYLOPRIM) 100 mg, Daily    atorvastatin (LIPITOR) 40 mg, Daily    blood sugar diagnostic (OneTouch Verio test strips) strip For glucose testing once daily    blood-glucose meter (OneTouch Verio Flex meter) misc For glucose testing once daily    cholecalciferol (Vitamin D-3) 50 mcg (2,000 unit) capsule 1 capsule, Daily RT    colestipol (Colestid) 1 gram tablet Take by mouth.    escitalopram (LEXAPRO) 5 mg, Daily    furosemide (Lasix) 20 mg tablet Take by mouth.    icosapent ethyL (Vascepa) 1 gram capsule Take by mouth.    lancets (OneTouch Delica Plus Lancet) 33 gauge misc For glucose testing once daily    levothyroxine (Synthroid, Levoxyl) 25 mcg tablet 1 tablet, Daily (0630)    lisinopril 10 mg, Daily    metFORMIN (GLUCOPHAGE) 1,000 mg, oral, 2 times daily (morning and late afternoon)    Mounjaro 5 mg, subcutaneous, Every 7 days     multivitamin capsule Daily RT    pioglitazone (ACTOS) 15 mg, Daily RT    polyethylene glycol (GLYCOLAX, MIRALAX) 17 g, Daily RT    potassium chloride CR 10 mEq ER tablet 10 mEq, Daily RT       Allergies  Patient has no known allergies.    Last Recorded Vitals  Blood pressure 127/77, pulse 81, weight 79.8 kg (176 lb).    Physical Exam  Constitutional:       General: She is not in acute distress.  Neurological:      Mental Status: She is alert.          Relevant Results  Glucose   Date Value   12/20/2023 134 mg/dL (H)   12/20/2023 124 mg/dL (H)   07/20/2020 88 mg/dL   11/13/2018 93 MG/DL     POC HEMOGLOBIN A1c (%)   Date Value   01/24/2025 5.8   07/19/2024 6.0   01/08/2024 6.5     Hemoglobin A1C (%)   Date Value   01/23/2024 5.8 (H)     Bicarbonate   Date Value   12/20/2023 20 mmol/L (L)   12/20/2023 16 mmol/L (L)   07/20/2020 25 mmol/L   11/13/2018 23 MMOL/L (L)     Urea Nitrogen   Date Value   12/20/2023 8 mg/dL   12/20/2023 8 mg/dL   07/20/2020 12 mg/dL   11/13/2018 11 MG/DL     Creatinine   Date Value   12/20/2023 0.40 mg/dL   12/20/2023 0.40 mg/dL   07/20/2020 0.43 mg/dL (L)   11/13/2018 0.5 MG/DL     Component  Ref Range & Units 4 mo ago Comments   Protein, Total  6.3 - 8.0 g/dL 7.5    Albumin  3.9 - 4.9 g/dL 4.5    Calcium, Total  8.5 - 10.2 mg/dL 9.3    Bilirubin, Total  0.2 - 1.3 mg/dL 0.4    Alkaline Phosphatase  34 - 123 U/L 82    AST  13 - 35 U/L 29    ALT  7 - 38 U/L 26    Glucose  74 - 99 mg/dL 96 The American Diabetes Association (ADA) provides guidance for cutoff values for fasting glucose and random glucose. The ADA defines fasting as no caloric intake for at least 8 hours. Fasting plasma glucose results between 100 to 125 mg/dL indicate increased risk for diabetes (prediabetes).  Fasting plasma glucose results greater than or equal to 126 mg/dL meet the criteria for diagnosis of diabetes. In the absence of unequivocal hyperglycemia, results should be confirmed by repeat testing. In a patient with  classic symptoms of hyperglycemia or hyperglycemic crisis, random plasma glucose results greater than or equal to 200 mg/dL meet the criteria for diagnosis of diabetes.  Reference: Standards of Medical Care in Diabetes 2016, American Diabetes Association. Diabetes Care. 2016.39(Suppl 1).   BUN  7 - 21 mg/dL 10    Creatinine  0.58 - 0.96 mg/dL 0.48 Low     Sodium  136 - 144 mmol/L 139    Potassium  3.7 - 5.1 mmol/L 4.4    Chloride  98 - 107 mmol/L 101    CO2  22 - 30 mmol/L 23    Anion Gap  8 - 15 mmol/L 15    Estimated Glomerular Filtration Rate  >=60 mL/min/1.73m² 105 Estimated Glomerular Filtration Rate (eGFR) is calculated using the 2021 CKD-EPI creatinine equation. This equation utilizes serum creatinine, sex, and age as parameters. The creatinine assay has traceable calibration to isotope dilution-mass spectrometry. Refer to KDIGO guidelines for clinical interpretation. In patients with unstable renal function, e.g. those with acute kidney injury, the eGFR may not accurately reflect actual GFR.   [facility]    Specimen Collected: 09/05/24 10:35       IMPRESSION  TYPE 2 DIABETES MELLITUS      RECOMMENDATIONS  STOP Pioglitazone  Increase Mounjaro to 7.5 mg/week    Follow up 6 months  Labs as ordered by Dr. Peters.

## 2025-01-24 NOTE — PATIENT INSTRUCTIONS
RECOMMENDATIONS  STOP Pioglitazone  Increase Mounjaro to 7.5 mg/week    Follow up 6 months  Labs as ordered by Dr. Peters.

## 2025-01-24 NOTE — LETTER
January 26, 2025     Malik GREEN DO  [address] [place] [id number]    Patient: Trini Perez   YOB: 1960   Date of Visit: 1/24/2025       Dear Dr. Malik GREEN DO:    Thank you for referring Trini Perez to me for evaluation. Below are my notes for this consultation.  If you have questions, please do not hesitate to call me. I look forward to following your patient along with you.       Sincerely,     Star Bhandari MD      CC: No Recipients  ______________________________________________________________________________________    History Of Present Illness  Trini Perez is a 64 y.o. female     Duration of type 2 diabetes mellitus:  at least 17 years  Complications:  none     Bump on right foot, not discussed at podiatry appointment 9 days ago.  Diabetic shoes  She had to wear her regular shoes on the opposite feet to compensate previously.    Weight loss with last dose changes     Mounjaro 5 mg/week on Saturdays  Metformin 1000 mg twice daily    Pioglitazone discontinued     Testing glucose once per week  No records available     Patient lives independently  Presented with .      Last eye exam:  2024    Podiatry:  Dr. Kowalski    Past Medical History  She has no past medical history on file.    Surgical History  She has no past surgical history on file.     Social History  She reports that she has never smoked. She has never been exposed to tobacco smoke. She has never used smokeless tobacco. She reports that she does not drink alcohol and does not use drugs.    Family History  No family history on file.    Medications  Current Outpatient Medications   Medication Instructions   • allopurinol (ZYLOPRIM) 100 mg, Daily   • atorvastatin (LIPITOR) 40 mg, Daily   • blood sugar diagnostic (OneTouch Verio test strips) strip For glucose testing once daily   • blood-glucose meter (OneTouch Verio Flex meter) misc For glucose testing once daily   • cholecalciferol (Vitamin D-3)  50 mcg (2,000 unit) capsule 1 capsule, Daily RT   • colestipol (Colestid) 1 gram tablet Take by mouth.   • escitalopram (LEXAPRO) 5 mg, Daily   • furosemide (Lasix) 20 mg tablet Take by mouth.   • icosapent ethyL (Vascepa) 1 gram capsule Take by mouth.   • lancets (OneTouch Delica Plus Lancet) 33 gauge misc For glucose testing once daily   • levothyroxine (Synthroid, Levoxyl) 25 mcg tablet 1 tablet, Daily (0630)   • lisinopril 10 mg, Daily   • metFORMIN (GLUCOPHAGE) 1,000 mg, oral, 2 times daily (morning and late afternoon)   • Mounjaro 5 mg, subcutaneous, Every 7 days   • multivitamin capsule Daily RT   • pioglitazone (ACTOS) 15 mg, Daily RT   • polyethylene glycol (GLYCOLAX, MIRALAX) 17 g, Daily RT   • potassium chloride CR 10 mEq ER tablet 10 mEq, Daily RT       Allergies  Patient has no known allergies.    Last Recorded Vitals  Blood pressure 127/77, pulse 81, weight 79.8 kg (176 lb).    Physical Exam  Constitutional:       General: She is not in acute distress.  Neurological:      Mental Status: She is alert.          Relevant Results  Glucose   Date Value   12/20/2023 134 mg/dL (H)   12/20/2023 124 mg/dL (H)   07/20/2020 88 mg/dL   11/13/2018 93 MG/DL     POC HEMOGLOBIN A1c (%)   Date Value   01/24/2025 5.8   07/19/2024 6.0   01/08/2024 6.5     Hemoglobin A1C (%)   Date Value   01/23/2024 5.8 (H)     Bicarbonate   Date Value   12/20/2023 20 mmol/L (L)   12/20/2023 16 mmol/L (L)   07/20/2020 25 mmol/L   11/13/2018 23 MMOL/L (L)     Urea Nitrogen   Date Value   12/20/2023 8 mg/dL   12/20/2023 8 mg/dL   07/20/2020 12 mg/dL   11/13/2018 11 MG/DL     Creatinine   Date Value   12/20/2023 0.40 mg/dL   12/20/2023 0.40 mg/dL   07/20/2020 0.43 mg/dL (L)   11/13/2018 0.5 MG/DL     Component  Ref Range & Units 4 mo ago Comments   Protein, Total  6.3 - 8.0 g/dL 7.5    Albumin  3.9 - 4.9 g/dL 4.5    Calcium, Total  8.5 - 10.2 mg/dL 9.3    Bilirubin, Total  0.2 - 1.3 mg/dL 0.4    Alkaline Phosphatase  34 - 123 U/L 82     AST  13 - 35 U/L 29    ALT  7 - 38 U/L 26    Glucose  74 - 99 mg/dL 96 The American Diabetes Association (ADA) provides guidance for cutoff values for fasting glucose and random glucose. The ADA defines fasting as no caloric intake for at least 8 hours. Fasting plasma glucose results between 100 to 125 mg/dL indicate increased risk for diabetes (prediabetes).  Fasting plasma glucose results greater than or equal to 126 mg/dL meet the criteria for diagnosis of diabetes. In the absence of unequivocal hyperglycemia, results should be confirmed by repeat testing. In a patient with classic symptoms of hyperglycemia or hyperglycemic crisis, random plasma glucose results greater than or equal to 200 mg/dL meet the criteria for diagnosis of diabetes.  Reference: Standards of Medical Care in Diabetes 2016, American Diabetes Association. Diabetes Care. 2016.39(Suppl 1).   BUN  7 - 21 mg/dL 10    Creatinine  0.58 - 0.96 mg/dL 0.48 Low     Sodium  136 - 144 mmol/L 139    Potassium  3.7 - 5.1 mmol/L 4.4    Chloride  98 - 107 mmol/L 101    CO2  22 - 30 mmol/L 23    Anion Gap  8 - 15 mmol/L 15    Estimated Glomerular Filtration Rate  >=60 mL/min/1.73m² 105 Estimated Glomerular Filtration Rate (eGFR) is calculated using the 2021 CKD-EPI creatinine equation. This equation utilizes serum creatinine, sex, and age as parameters. The creatinine assay has traceable calibration to isotope dilution-mass spectrometry. Refer to KDIGO guidelines for clinical interpretation. In patients with unstable renal function, e.g. those with acute kidney injury, the eGFR may not accurately reflect actual GFR.   [facility]    Specimen Collected: 09/05/24 10:35       IMPRESSION  TYPE 2 DIABETES MELLITUS      RECOMMENDATIONS  STOP Pioglitazone  Increase Mounjaro to 7.5 mg/week    Follow up 6 months  Labs as ordered by Dr. Peters.

## 2025-06-16 DIAGNOSIS — E11.9 TYPE 2 DIABETES MELLITUS WITHOUT COMPLICATION, WITHOUT LONG-TERM CURRENT USE OF INSULIN: ICD-10-CM

## 2025-06-17 RX ORDER — METFORMIN HYDROCHLORIDE 1000 MG/1
TABLET ORAL
Qty: 180 TABLET | Refills: 3 | Status: SHIPPED | OUTPATIENT
Start: 2025-06-17

## 2025-07-29 ENCOUNTER — APPOINTMENT (OUTPATIENT)
Dept: ENDOCRINOLOGY | Facility: CLINIC | Age: 65
End: 2025-07-29
Payer: COMMERCIAL

## 2025-07-29 VITALS
HEART RATE: 71 BPM | BODY MASS INDEX: 27.54 KG/M2 | SYSTOLIC BLOOD PRESSURE: 120 MMHG | WEIGHT: 141 LBS | DIASTOLIC BLOOD PRESSURE: 75 MMHG

## 2025-07-29 DIAGNOSIS — E11.9 TYPE 2 DIABETES MELLITUS WITHOUT COMPLICATION, WITHOUT LONG-TERM CURRENT USE OF INSULIN: ICD-10-CM

## 2025-07-29 LAB — POC HEMOGLOBIN A1C: 5.5 % (ref 4.2–6.5)

## 2025-07-29 PROCEDURE — 1036F TOBACCO NON-USER: CPT | Performed by: INTERNAL MEDICINE

## 2025-07-29 PROCEDURE — 3044F HG A1C LEVEL LT 7.0%: CPT | Performed by: INTERNAL MEDICINE

## 2025-07-29 PROCEDURE — 3078F DIAST BP <80 MM HG: CPT | Performed by: INTERNAL MEDICINE

## 2025-07-29 PROCEDURE — 1159F MED LIST DOCD IN RCRD: CPT | Performed by: INTERNAL MEDICINE

## 2025-07-29 PROCEDURE — 1160F RVW MEDS BY RX/DR IN RCRD: CPT | Performed by: INTERNAL MEDICINE

## 2025-07-29 PROCEDURE — 4010F ACE/ARB THERAPY RXD/TAKEN: CPT | Performed by: INTERNAL MEDICINE

## 2025-07-29 PROCEDURE — 83036 HEMOGLOBIN GLYCOSYLATED A1C: CPT | Performed by: INTERNAL MEDICINE

## 2025-07-29 PROCEDURE — G2211 COMPLEX E/M VISIT ADD ON: HCPCS | Performed by: INTERNAL MEDICINE

## 2025-07-29 PROCEDURE — 3074F SYST BP LT 130 MM HG: CPT | Performed by: INTERNAL MEDICINE

## 2025-07-29 PROCEDURE — 99214 OFFICE O/P EST MOD 30 MIN: CPT | Performed by: INTERNAL MEDICINE

## 2025-07-29 ASSESSMENT — ENCOUNTER SYMPTOMS
NERVOUS/ANXIOUS: 0
APPETITE CHANGE: 0
SHORTNESS OF BREATH: 0
ARTHRALGIAS: 0
SORE THROAT: 0
UNEXPECTED WEIGHT CHANGE: 1
HEADACHES: 0
NAUSEA: 0
VOMITING: 0
CONSTIPATION: 0
ABDOMINAL PAIN: 0
COUGH: 0
DIARRHEA: 0
POLYDIPSIA: 0
FREQUENCY: 0
FEVER: 0

## 2025-07-29 NOTE — PROGRESS NOTES
History Of Present Illness  Trini Perez is a 64 y.o. female     Duration of type 2 diabetes mellitus:  at least 17 years  Complications:  none     Mounjaro 7.5 mg/week on Saturdays  Metformin 1000 mg twice daily      Testing glucose once per week  No records available     Patient lives independently  Presented with .      Last eye exam:  2024     Podiatry:  Dr. Kowalski    Past Medical History  She has no past medical history on file.    Surgical History  She has no past surgical history on file.     Social History  She reports that she has never smoked. She has never been exposed to tobacco smoke. She has never used smokeless tobacco. She reports that she does not drink alcohol and does not use drugs.    Family History  Family History[1]    Medications  Current Outpatient Medications   Medication Instructions    allopurinol (ZYLOPRIM) 100 mg, Daily    atorvastatin (LIPITOR) 40 mg, Daily    blood sugar diagnostic (OneTouch Verio test strips) strip For glucose testing once daily    blood-glucose meter (OneTouch Verio Flex meter) misc For glucose testing once daily    cholecalciferol (Vitamin D-3) 50 mcg (2,000 unit) capsule 1 capsule, Daily RT    colestipol (Colestid) 1 gram tablet Take by mouth.    furosemide (Lasix) 20 mg tablet Take by mouth.    icosapent ethyL (Vascepa) 1 gram capsule Take by mouth.    lancets (OneTouch Delica Plus Lancet) 33 gauge misc For glucose testing once daily    levothyroxine (Synthroid, Levoxyl) 25 mcg tablet 1 tablet, Daily (0630)    lisinopril 10 mg, Daily    metFORMIN (Glucophage) 1,000 mg tablet TAKE ONE TABLET BY MOUTH TWO TIMES A DAY (MORNING AND LATE AFTERNOON)    Mounjaro 7.5 mg, subcutaneous, Every 7 days    multivitamin capsule Daily RT    polyethylene glycol (GLYCOLAX, MIRALAX) 17 g, Daily RT    potassium chloride CR 10 mEq ER tablet 10 mEq, Daily RT       Allergies  Patient has no known allergies.    Review of Systems   Constitutional:  Positive for unexpected  weight change. Negative for appetite change and fever.   HENT:  Negative for sore throat.         Denies dry mouth   Eyes:  Negative for visual disturbance.   Respiratory:  Negative for cough and shortness of breath.    Cardiovascular:  Negative for chest pain.   Gastrointestinal:  Negative for abdominal pain, constipation, diarrhea, nausea and vomiting.   Endocrine: Negative for polydipsia and polyuria.   Genitourinary:  Negative for frequency.   Musculoskeletal:  Negative for arthralgias.   Skin:  Negative for rash.   Neurological:  Negative for headaches.   Psychiatric/Behavioral:  The patient is not nervous/anxious.          Last Recorded Vitals  Blood pressure 120/75, pulse 71, weight 64 kg (141 lb).    Physical Exam  Constitutional:       General: She is not in acute distress.  HENT:      Head: Normocephalic.      Mouth/Throat:      Mouth: Mucous membranes are moist.     Eyes:      Extraocular Movements: Extraocular movements intact.     Neck:      Thyroid: No thyroid mass or thyromegaly.     Cardiovascular:      Pulses:           Radial pulses are 2+ on the right side and 2+ on the left side.     Musculoskeletal:      Right lower leg: No edema.      Left lower leg: No edema.   Lymphadenopathy:      Cervical: No cervical adenopathy.     Skin:     Comments: Stasis pigmentation at lower legs     Neurological:      Mental Status: She is alert.      Motor: No tremor.     Psychiatric:         Mood and Affect: Affect normal.          Relevant Results  Component  Ref Range & Units 4 mo ago Comments   Protein, Total  6.3 - 8.0 g/dL 7.5    Albumin  3.9 - 4.9 g/dL 4.3    Calcium, Total  8.5 - 10.2 mg/dL 9.6    Bilirubin, Total  0.2 - 1.3 mg/dL 0.5    Alkaline Phosphatase  34 - 123 U/L 66    AST  13 - 35 U/L 28    ALT  7 - 38 U/L 24    Glucose  74 - 99 mg/dL 104 High  The American Diabetes Association (ADA) provides guidance for cutoff values for fasting glucose and random glucose. The ADA defines fasting as no caloric  intake for at least 8 hours. Fasting plasma glucose results between 100 to 125 mg/dL indicate increased risk for diabetes (prediabetes).  Fasting plasma glucose results greater than or equal to 126 mg/dL meet the criteria for diagnosis of diabetes. In the absence of unequivocal hyperglycemia, results should be confirmed by repeat testing. In a patient with classic symptoms of hyperglycemia or hyperglycemic crisis, random plasma glucose results greater than or equal to 200 mg/dL meet the criteria for diagnosis of diabetes.  Reference: Standards of Medical Care in Diabetes 2016, American Diabetes Association. Diabetes Care. 2016.39(Suppl 1).   BUN  7 - 21 mg/dL 10    Creatinine  0.58 - 0.96 mg/dL 0.46 Low     Sodium  136 - 144 mmol/L 137    Potassium  3.7 - 5.1 mmol/L 4.2    Chloride  98 - 107 mmol/L 100    CO2  22 - 30 mmol/L 25    Anion Gap  8 - 15 mmol/L 12    Estimated Glomerular Filtration Rate  >=60 mL/min/1.73m² 106 Estimated Glomerular Filtration Rate (eGFR) is calculated using the 2021 CKD-EPI creatinine equation. This equation utilizes serum creatinine, sex, and age as parameters. The creatinine assay has traceable calibration to isotope dilution-mass spectrometry. Refer to KDIGO guidelines for clinical interpretation. In patients with unstable renal function, e.g. those with acute kidney injury, the eGFR may not accurately reflect actual GFR.   Resulting Agency [facility]    Specimen Collected: 03/25/25 07:20    Performed by: [facility] Last Resulted: 03/25/25 10:41     Component  Ref Range & Units 4 mo ago   TSH  0.270 - 4.200 mIU/L 3.45   Resulting Agency [facility]   Specimen Collected: 03/25/25 07:20    Performed by: [facility] Last Resulted: 03/25/25 10:53     Component  Ref Range & Units 4 mo ago Comments   Cholesterol, Total  <200 mg/dL 76 <200 mg/dL, Desirable   200-239 mg/dL, Borderline high  >239 mg/dL, High   Triglyceride  <150 mg/dL 109 <150 mg/dL,  Normal   150-199 mg/dL, Borderline high   200-499 mg/dL, High  >499 mg/dL, Very high   HDL Cholesterol  >39 mg/dL 33 Low   40-59 mg/dL, Acceptable  >59 mg/dL, High: Negative risk factor for coronary heart disease  <40 mg/dL, Low: Positive risk factor for coronary heart disease   Non HDL Cholesterol  <130 mg/dL 43 <130 mg/dL, Optimal   130-159 mg/dL, Near optimal/above optimal   160-189 mg/dL, Borderline high   190-219 mg/dL, High  >219 mg/dL, Very high  Secondary prevention optimal non HDL Cholesterol levels are recommended to be <100 mg/dL   Fasting Time  hrs 2    VLDL Cholesterol  <30 mg/dL 22    TC:HDL Ratio  <5.10 2.3    LDL Cholesterol, Calculated  <100 mg/dL 21 <100 mg/dL, Optimal   100-129 mg/dL, Near optimal/above optimal   130-159 mg/dL, Borderline high   160-189 mg/dL, High  >189 mg/dL, Very high  Secondary prevention optimal LDL Cholesterol levels are recommended to be < 70 mg/dL   LDL:HDL Ratio  <2.54 0.64 Reference:  1. National Cholesterol Education Program ATP III Guideline At-A-Glance Quick Desk Reference: National Heart, Lung, [facility] [place]. National Institutes of Health. 2001: NIH Publication No. .  2. An International Atherosclerosis Society position paper: global recommendations for the management of dyslipidemia: executive summary, Atherosclerosis. 2014: 232(2):410-413.   Resulting Agency [facility]    Specimen Collected: 03/25/25 07:20    Performed by: [facility] Last Resulted: 03/25/25 10:41     Glucose   Date Value   12/20/2023 134 mg/dL (H)   12/20/2023 124 mg/dL (H)   07/20/2020 88 mg/dL   11/13/2018 93 MG/DL     POC HEMOGLOBIN A1c (%)   Date Value   07/29/2025 5.5   01/24/2025 5.8   07/19/2024 6.0     Hemoglobin A1C (%)   Date Value   03/25/2025 5.4     Bicarbonate   Date Value   12/20/2023 20 mmol/L (L)   12/20/2023 16 mmol/L (L)   07/20/2020 25 mmol/L   11/13/2018 23 MMOL/L (L)     Urea Nitrogen   Date Value   12/20/2023 8 mg/dL   12/20/2023 8 mg/dL    07/20/2020 12 mg/dL   11/13/2018 11 MG/DL     Creatinine   Date Value   12/20/2023 0.40 mg/dL   12/20/2023 0.40 mg/dL   07/20/2020 0.43 mg/dL (L)   11/13/2018 0.5 MG/DL       IMPRESSION  TYPE 2 DIABETES MELLITUS  Rapid A1c 5.5%  Excellent glucose control      RECOMMENDATIONS  Continue current program  Follow up 6 months             [1] No family history on file.

## 2025-07-29 NOTE — LETTER
July 29, 2025     Malik GREEN DO  [address] [place] [id number]    Patient: Trini Perez   YOB: 1960   Date of Visit: 7/29/2025       Dear Dr. Malik GREEN DO:    Thank you for referring Trini Perez to me for evaluation. Below are my notes for this consultation.  If you have questions, please do not hesitate to call me. I look forward to following your patient along with you.       Sincerely,     Star Bhandari MD      CC: No Recipients  ______________________________________________________________________________________    History Of Present Illness  Trini Perez is a 64 y.o. female     Duration of type 2 diabetes mellitus:  at least 17 years  Complications:  none     Mounjaro 7.5 mg/week on Saturdays  Metformin 1000 mg twice daily      Testing glucose once per week  No records available     Patient lives independently  Presented with .      Last eye exam:  2024     Podiatry:  Dr. Kowalski    Past Medical History  She has no past medical history on file.    Surgical History  She has no past surgical history on file.     Social History  She reports that she has never smoked. She has never been exposed to tobacco smoke. She has never used smokeless tobacco. She reports that she does not drink alcohol and does not use drugs.    Family History  Family History[1]    Medications  Current Outpatient Medications   Medication Instructions   • allopurinol (ZYLOPRIM) 100 mg, Daily   • atorvastatin (LIPITOR) 40 mg, Daily   • blood sugar diagnostic (OneTouch Verio test strips) strip For glucose testing once daily   • blood-glucose meter (OneTouch Verio Flex meter) misc For glucose testing once daily   • cholecalciferol (Vitamin D-3) 50 mcg (2,000 unit) capsule 1 capsule, Daily RT   • colestipol (Colestid) 1 gram tablet Take by mouth.   • furosemide (Lasix) 20 mg tablet Take by mouth.   • icosapent ethyL (Vascepa) 1 gram capsule Take by mouth.   • lancets (OneTouch Delica Plus  Lancet) 33 gauge misc For glucose testing once daily   • levothyroxine (Synthroid, Levoxyl) 25 mcg tablet 1 tablet, Daily (0630)   • lisinopril 10 mg, Daily   • metFORMIN (Glucophage) 1,000 mg tablet TAKE ONE TABLET BY MOUTH TWO TIMES A DAY (MORNING AND LATE AFTERNOON)   • Mounjaro 7.5 mg, subcutaneous, Every 7 days   • multivitamin capsule Daily RT   • polyethylene glycol (GLYCOLAX, MIRALAX) 17 g, Daily RT   • potassium chloride CR 10 mEq ER tablet 10 mEq, Daily RT       Allergies  Patient has no known allergies.    Review of Systems   Constitutional:  Positive for unexpected weight change. Negative for appetite change and fever.   HENT:  Negative for sore throat.         Denies dry mouth   Eyes:  Negative for visual disturbance.   Respiratory:  Negative for cough and shortness of breath.    Cardiovascular:  Negative for chest pain.   Gastrointestinal:  Negative for abdominal pain, constipation, diarrhea, nausea and vomiting.   Endocrine: Negative for polydipsia and polyuria.   Genitourinary:  Negative for frequency.   Musculoskeletal:  Negative for arthralgias.   Skin:  Negative for rash.   Neurological:  Negative for headaches.   Psychiatric/Behavioral:  The patient is not nervous/anxious.          Last Recorded Vitals  Blood pressure 120/75, pulse 71, weight 64 kg (141 lb).    Physical Exam  Constitutional:       General: She is not in acute distress.  HENT:      Head: Normocephalic.      Mouth/Throat:      Mouth: Mucous membranes are moist.     Eyes:      Extraocular Movements: Extraocular movements intact.     Neck:      Thyroid: No thyroid mass or thyromegaly.     Cardiovascular:      Pulses:           Radial pulses are 2+ on the right side and 2+ on the left side.     Musculoskeletal:      Right lower leg: No edema.      Left lower leg: No edema.   Lymphadenopathy:      Cervical: No cervical adenopathy.     Skin:     Comments: Stasis pigmentation at lower legs     Neurological:      Mental Status: She is  alert.      Motor: No tremor.     Psychiatric:         Mood and Affect: Affect normal.          Relevant Results  Component  Ref Range & Units 4 mo ago Comments   Protein, Total  6.3 - 8.0 g/dL 7.5    Albumin  3.9 - 4.9 g/dL 4.3    Calcium, Total  8.5 - 10.2 mg/dL 9.6    Bilirubin, Total  0.2 - 1.3 mg/dL 0.5    Alkaline Phosphatase  34 - 123 U/L 66    AST  13 - 35 U/L 28    ALT  7 - 38 U/L 24    Glucose  74 - 99 mg/dL 104 High  The American Diabetes Association (ADA) provides guidance for cutoff values for fasting glucose and random glucose. The ADA defines fasting as no caloric intake for at least 8 hours. Fasting plasma glucose results between 100 to 125 mg/dL indicate increased risk for diabetes (prediabetes).  Fasting plasma glucose results greater than or equal to 126 mg/dL meet the criteria for diagnosis of diabetes. In the absence of unequivocal hyperglycemia, results should be confirmed by repeat testing. In a patient with classic symptoms of hyperglycemia or hyperglycemic crisis, random plasma glucose results greater than or equal to 200 mg/dL meet the criteria for diagnosis of diabetes.  Reference: Standards of Medical Care in Diabetes 2016, American Diabetes Association. Diabetes Care. 2016.39(Suppl 1).   BUN  7 - 21 mg/dL 10    Creatinine  0.58 - 0.96 mg/dL 0.46 Low     Sodium  136 - 144 mmol/L 137    Potassium  3.7 - 5.1 mmol/L 4.2    Chloride  98 - 107 mmol/L 100    CO2  22 - 30 mmol/L 25    Anion Gap  8 - 15 mmol/L 12    Estimated Glomerular Filtration Rate  >=60 mL/min/1.73m² 106 Estimated Glomerular Filtration Rate (eGFR) is calculated using the 2021 CKD-EPI creatinine equation. This equation utilizes serum creatinine, sex, and age as parameters. The creatinine assay has traceable calibration to isotope dilution-mass spectrometry. Refer to KDIGO guidelines for clinical interpretation. In patients with unstable renal function, e.g. those with acute kidney injury, the eGFR may not accurately reflect  actual GFR.   Resulting Agency [facility]    Specimen Collected: 03/25/25 07:20    Performed by: [facility] Last Resulted: 03/25/25 10:41     Component  Ref Range & Units 4 mo ago   TSH  0.270 - 4.200 mIU/L 3.45   Resulting Agency [facility]   Specimen Collected: 03/25/25 07:20    Performed by: [facility] Last Resulted: 03/25/25 10:53     Component  Ref Range & Units 4 mo ago Comments   Cholesterol, Total  <200 mg/dL 76 <200 mg/dL, Desirable   200-239 mg/dL, Borderline high  >239 mg/dL, High   Triglyceride  <150 mg/dL 109 <150 mg/dL, Normal   150-199 mg/dL, Borderline high   200-499 mg/dL, High  >499 mg/dL, Very high   HDL Cholesterol  >39 mg/dL 33 Low   40-59 mg/dL, Acceptable  >59 mg/dL, High: Negative risk factor for coronary heart disease  <40 mg/dL, Low: Positive risk factor for coronary heart disease   Non HDL Cholesterol  <130 mg/dL 43 <130 mg/dL, Optimal   130-159 mg/dL, Near optimal/above optimal   160-189 mg/dL, Borderline high   190-219 mg/dL, High  >219 mg/dL, Very high  Secondary prevention optimal non HDL Cholesterol levels are recommended to be <100 mg/dL   Fasting Time  hrs 2    VLDL Cholesterol  <30 mg/dL 22    TC:HDL Ratio  <5.10 2.3    LDL Cholesterol, Calculated  <100 mg/dL 21 <100 mg/dL, Optimal   100-129 mg/dL, Near optimal/above optimal   130-159 mg/dL, Borderline high   160-189 mg/dL, High  >189 mg/dL, Very high  Secondary prevention optimal LDL Cholesterol levels are recommended to be < 70 mg/dL   LDL:HDL Ratio  <2.54 0.64 Reference:  1. National Cholesterol Education Program ATP III Guideline At-A-Glance Quick Desk Reference: National Heart, Lung, and Blood [place]. National Institutes of Health. 2001: NIH Publication No. .  2. An International Atherosclerosis Society position paper: global recommendations for the management of dyslipidemia: executive summary, Atherosclerosis. 2014: 232(2):410-413.   Resulting Agency [facility]     Specimen Collected: 03/25/25 07:20    Performed by: [facility] Last Resulted: 03/25/25 10:41     Glucose   Date Value   12/20/2023 134 mg/dL (H)   12/20/2023 124 mg/dL (H)   07/20/2020 88 mg/dL   11/13/2018 93 MG/DL     POC HEMOGLOBIN A1c (%)   Date Value   07/29/2025 5.5   01/24/2025 5.8   07/19/2024 6.0     Hemoglobin A1C (%)   Date Value   03/25/2025 5.4     Bicarbonate   Date Value   12/20/2023 20 mmol/L (L)   12/20/2023 16 mmol/L (L)   07/20/2020 25 mmol/L   11/13/2018 23 MMOL/L (L)     Urea Nitrogen   Date Value   12/20/2023 8 mg/dL   12/20/2023 8 mg/dL   07/20/2020 12 mg/dL   11/13/2018 11 MG/DL     Creatinine   Date Value   12/20/2023 0.40 mg/dL   12/20/2023 0.40 mg/dL   07/20/2020 0.43 mg/dL (L)   11/13/2018 0.5 MG/DL       IMPRESSION  TYPE 2 DIABETES MELLITUS  Rapid A1c 5.5%  Excellent glucose control      RECOMMENDATIONS  Continue current program  Follow up 6 months             [1]  No family history on file.       [1]  No family history on file.

## 2026-02-03 ENCOUNTER — APPOINTMENT (OUTPATIENT)
Dept: ENDOCRINOLOGY | Facility: CLINIC | Age: 66
End: 2026-02-03
Payer: COMMERCIAL